# Patient Record
Sex: MALE | Race: WHITE | URBAN - METROPOLITAN AREA
[De-identification: names, ages, dates, MRNs, and addresses within clinical notes are randomized per-mention and may not be internally consistent; named-entity substitution may affect disease eponyms.]

---

## 2017-08-28 ENCOUNTER — OFFICE VISIT (OUTPATIENT)
Dept: FAMILY MEDICINE | Facility: CLINIC | Age: 32
End: 2017-08-28

## 2017-08-28 VITALS
OXYGEN SATURATION: 98 % | BODY MASS INDEX: 28.42 KG/M2 | TEMPERATURE: 98 F | DIASTOLIC BLOOD PRESSURE: 88 MMHG | SYSTOLIC BLOOD PRESSURE: 128 MMHG | HEIGHT: 66 IN | WEIGHT: 176.8 LBS | HEART RATE: 70 BPM

## 2017-08-28 DIAGNOSIS — Z00.00 ROUTINE HISTORY AND PHYSICAL EXAMINATION OF ADULT: ICD-10-CM

## 2017-08-28 DIAGNOSIS — Z11.3 SCREEN FOR STD (SEXUALLY TRANSMITTED DISEASE): ICD-10-CM

## 2017-08-28 DIAGNOSIS — R22.30 SHOULDER MASS: Primary | ICD-10-CM

## 2017-08-28 DIAGNOSIS — Z71.89 ACP (ADVANCE CARE PLANNING): ICD-10-CM

## 2017-08-28 LAB — GLUCOSE SERPL-MCNC: 95 MG/DL (ref 60–99)

## 2017-08-28 PROCEDURE — 99395 PREV VISIT EST AGE 18-39: CPT | Performed by: PHYSICIAN ASSISTANT

## 2017-08-28 PROCEDURE — 87389 HIV-1 AG W/HIV-1&-2 AB AG IA: CPT | Mod: 90 | Performed by: PHYSICIAN ASSISTANT

## 2017-08-28 PROCEDURE — 80061 LIPID PANEL: CPT | Mod: 90 | Performed by: PHYSICIAN ASSISTANT

## 2017-08-28 PROCEDURE — 87491 CHLMYD TRACH DNA AMP PROBE: CPT | Mod: 90 | Performed by: PHYSICIAN ASSISTANT

## 2017-08-28 PROCEDURE — 36415 COLL VENOUS BLD VENIPUNCTURE: CPT | Performed by: PHYSICIAN ASSISTANT

## 2017-08-28 PROCEDURE — 73030 X-RAY EXAM OF SHOULDER: CPT | Mod: RT | Performed by: PHYSICIAN ASSISTANT

## 2017-08-28 PROCEDURE — 82947 ASSAY GLUCOSE BLOOD QUANT: CPT | Performed by: PHYSICIAN ASSISTANT

## 2017-08-28 PROCEDURE — 86592 SYPHILIS TEST NON-TREP QUAL: CPT | Mod: 90 | Performed by: PHYSICIAN ASSISTANT

## 2017-08-28 PROCEDURE — 87591 N.GONORRHOEAE DNA AMP PROB: CPT | Mod: 90 | Performed by: PHYSICIAN ASSISTANT

## 2017-08-28 NOTE — PROGRESS NOTES
SUBJECTIVE:     CC: Brayden Kang is an 32 year old male who presents for preventative health visit.     Healthy Habits:      Amount of exercise or daily activities, outside of work: 2-3 days week exercising    Problems taking medications regularly not applicable    Medication side effects: No    Have you had an eye exam in the past two years? yes    Do you see a dentist twice per year? Yes    Do you have sleep apnea, excessive snoring or daytime drowsiness?no      Last week noticed bump on right shoulder  Not painful  No trauma known.  About 10 years ago felt popping sensation     Today's PHQ-2 Score:   PHQ-2 ( 1999 [employer]) 8/28/2017 7/22/2016   Q1: Little interest or pleasure in doing things 0 0   Q2: Feeling down, depressed or hopeless 0 0   PHQ-2 Score 0 0         Social History   Substance Use Topics     Smoking status: Former Smoker     Packs/day: 0.50     Years: 6.00     Types: Cigarettes     Smokeless tobacco: Never Used      Comment: Quit smoking early 2011     Alcohol use 1.2 oz/week     2 Standard drinks or equivalent per week     The patient does not drink >3 drinks per day nor >7 drinks per week.    Last PSA: No results found for: PSA    Recent Labs   Lab Test  07/22/16   1142  03/25/15   0904   CHOL  195  172   HDL  57  50   LDL  125  109   TRIG  63  64   CHOLHDLRATIO  3.4  3.4       Reviewed orders with patient. Reviewed health maintenance and updated orders accordingly - Yes    All Histories reviewed and updated in Epic.  Past Medical History:   Diagnosis Date     Atypical mole 1993    mole removed from chin, benign      No past surgical history on file.    ROS:  C: NEGATIVE for fever, chills, change in weight  I: NEGATIVE for worrisome rashes, moles or lesions  E: NEGATIVE for vision changes or irritation  ENT: NEGATIVE for ear, mouth and throat problems  R: NEGATIVE for significant cough or SOB  CV: NEGATIVE for chest pain, palpitations or peripheral edema  GI: NEGATIVE for nausea, abdominal pain,  heartburn, or change in bowel habits   male: negative for dysuria, hematuria, decreased urinary stream, erectile dysfunction, urethral discharge  M: NEGATIVE for significant arthralgias or myalgia  N: NEGATIVE for weakness, dizziness or paresthesias  P: NEGATIVE for changes in mood or affect    Problem list, Medication list, Allergies, and Medical/Social/Surgical histories reviewed in EPIC and updated as appropriate.  Labs reviewed in EPIC  BP Readings from Last 3 Encounters:   17 128/88   16 122/80   03/25/15 118/72    Wt Readings from Last 3 Encounters:   17 80.2 kg (176 lb 12.8 oz)   16 74.8 kg (165 lb)   03/25/15 70.8 kg (156 lb)                  Patient Active Problem List   Diagnosis     Health Care Home     Family history of ulcerative colitis - father -  of sclerosing cholangitis     ACP (advance care planning)     No past surgical history on file.    Social History   Substance Use Topics     Smoking status: Former Smoker     Packs/day: 0.50     Years: 6.00     Types: Cigarettes     Smokeless tobacco: Never Used      Comment: Quit smoking early      Alcohol use 1.2 oz/week     2 Standard drinks or equivalent per week     Family History   Problem Relation Age of Onset     Uterine Cancer Mother      GASTROINTESTINAL DISEASE Father       sclerosing cholangitis, ulcerative colitis /ulcerative colitis      Ulcerative Colitis Father      Breast Cancer Other 24     Cousin- maternal     Skin Cancer Paternal Aunt      C.A.D. No family hx of      DIABETES No family hx of      Cancer - colorectal No family hx of      Prostate Cancer No family hx of      Hypertension No family hx of      Hyperlipidemia No family hx of      Ovarian Cancer No family hx of      Depression/Anxiety No family hx of      CEREBROVASCULAR DISEASE No family hx of      Anesthesia Reaction No family hx of      Thyroid Disease No family hx of      Asthma No family hx of      Chemical Addiction No family hx of   "        No current outpatient prescriptions on file.     No Known Allergies  Recent Labs   Lab Test  07/22/16   1142  03/25/15   0904  04/01/14   1105   LDL  125  109  95   HDL  57  50  67   TRIG  63  64  78   ALT   --   22  28   CR   --   1.06  0.97   GFRESTIMATED   --   95  107   POTASSIUM   --   4.3  4.2      OBJECTIVE:     /88 (BP Location: Left arm, Patient Position: Chair, Cuff Size: Adult Regular)  Pulse 70  Temp 98  F (36.7  C) (Oral)  Ht 1.676 m (5' 6\")  Wt 80.2 kg (176 lb 12.8 oz)  SpO2 98%  BMI 28.54 kg/m2  EXAM:  GENERAL: healthy, alert and no distress  EYES: Eyes grossly normal to inspection, PERRL and conjunctivae and sclerae normal  HENT: ear canals and TM's normal, nose and mouth without ulcers or lesions  NECK: no adenopathy, no asymmetry, masses, or scars and thyroid normal to palpation  RESP: lungs clear to auscultation - no rales, rhonchi or wheezes  CV: regular rate and rhythm, normal S1 S2, no S3 or S4, no murmur, click or rub  ABDOMEN: soft, nontender, no hepatosplenomegaly, no masses and bowel sounds normal   (male): normal male genitalia without lesions or urethral discharge, no hernia  RECTAL: normal sphincter tone, no rectal masses, prostate normal size, smooth, nontender without nodules or masses   MS: no gross musculoskeletal defects noted, no edema  Right shoulder there is a firm mass near the AC joint. Mass is below the skin and very hard.   SKIN: no suspicious lesions or rashes  NEURO: Normal strength and tone, mentation intact and speech normal  PSYCH: mentation appears normal, affect normal/bright    Xray : NL    ASSESSMENT/PLAN:     1. ACP (advance care planning)      2. Shoulder mass  Suspect this is just the end of the clavicle. Advised that if growing larger or becoming painful that he be seen by ortho  - X-ray rt shoulder 2 view    3. Routine history and physical examination of adult    - VENOUS COLLECTION  - Lipid Profile  - Glucose Fasting (BFP)    4. Screen for " "STD (sexually transmitted disease)    - RPR W/REFLEX TITER & CONFIRM  - Chlam Trach/N. Gonorrhoeae RNA TMA([facility]  - HIV 1/2 Agn Susi 4th Gen w Reflex ([facility])  - VENOUS COLLECTION    COUNSELING:   Special attention given to:        Regular exercise       Healthy diet/nutrition         Blood Pressure:   BP Readings from Last 6 Encounters:   08/28/17 128/88   07/22/16 122/80   03/25/15 118/72   04/01/14 114/70   09/04/12 104/80   01/21/08 110/60         BP Screening:   Last 3 BP Readings:    BP Readings from Last 3 Encounters:   08/28/17 128/88   07/22/16 122/80   03/25/15 118/72       The following was recommended to the patient:  Re-screen BP within a year and recommended lifestyle modifications       reports that he has quit smoking. His smoking use included Cigarettes. He has a 3.00 pack-year smoking history. He has never used smokeless tobacco.      Estimated body mass index is 28.54 kg/(m^2) as calculated from the following:    Height as of this encounter: 1.676 m (5' 6\").    Weight as of this encounter: 80.2 kg (176 lb 12.8 oz).   Weight management plan: Discussed healthy diet and exercise guidelines and patient will follow up in 12 months in clinic to re-evaluate.    Counseling Resources:  ATP IV Guidelines  Pooled Cohorts Equation Calculator  FRAX Risk Assessment  ICSI Preventive Guidelines  Dietary Guidelines for Americans, 2010  USDA's MyPlate  ASA Prophylaxis  Lung CA Screening    ZAKIYA Navarrete  [facility]    "

## 2017-08-28 NOTE — NURSING NOTE
Brayden is here for CPX and biometric form    Patient is here for a full physical exam.    Pre-Visit Screening :  Immunizations : up to date  Colon Screening : NA  Mammogram:NA  Asthma Action Test/Plan : NA  PHQ9/GAD7 :  NA  Fall Risk Assessment NA    Vitals:  Pulse - regular  My Chart - accepts    CLASSIFICATION OF OVERWEIGHT AND OBESITY BY BMI                         Obesity Class           BMI(kg/m2)  Underweight                                    < 18.5  Normal                                         18.5-24.9  Overweight                                     25.0-29.9  OBESITY                     I                  30.0-34.9                              II                 35.0-39.9  EXTREME OBESITY             III                >40                             Patient's  BMI Body mass index is 28.54 kg/(m^2).  http://hin.nhlbi.nih.gov/menuplanner/menu.cgi  Questioned patient about current smoking habits.  Pt. quit smoking some time ago.    ETOH screening:  Questions:  1-How often do you have a drink containing alcohol?                             2 times per week(s)  2-How many drinks containing alcohol do you have on a typical day when you are         Drinking?                              1   3- How often do you have 5 or more drinks on one occasion?                              Never    Have you ever:  None of the patient's responses to the CAGE screening were positive / Negative CAGE score

## 2017-08-28 NOTE — MR AVS SNAPSHOT
After Visit Summary   8/28/2017    Brayden Kang    MRN: [id number]           Patient Information     Date Of Birth          1985        Visit Information        Provider Department      8/28/2017 10:30 AM Whitney Roe PA [facility]        Today's Diagnoses     Shoulder mass    -  1    ACP (advance care planning)        Routine history and physical examination of adult        Screen for STD (sexually transmitted disease)           Follow-ups after your visit        Who to contact     If you have questions or need follow up information about today's clinic visit or your schedule please contact [facility] directly at [phone].  Normal or non-critical lab and imaging results will be communicated to you by MyChart, letter or phone within 4 business days after the clinic has received the results. If you do not hear from us within 7 days, please contact the clinic through [employer] or phone. If you have a critical or abnormal lab result, we will notify you by phone as soon as possible.  Submit refill requests through [employer] or call your pharmacy and they will forward the refill request to us. Please allow 3 business days for your refill to be completed.          Additional Information About Your Visit        MyChart Information     [employer] gives you secure access to your electronic health record. If you see a primary care provider, you can also send messages to your care team and make appointments. If you have questions, please call your primary care clinic.  If you do not have a primary care provider, please call [phone] and they will assist you.        Care EveryWhere ID     This is your Care EveryWhere ID. This could be used by other organizations to access your [facility] medical records  [id number]        Your Vitals Were     Pulse Temperature Height Pulse Oximetry BMI (Body Mass Index)       70 98  F (36.7  C) (Oral) 1.676 m  "(5' 6\") 98% 28.54 kg/m2        Blood Pressure from Last 3 Encounters:   08/28/17 128/88   07/22/16 122/80   03/25/15 118/72    Weight from Last 3 Encounters:   08/28/17 80.2 kg (176 lb 12.8 oz)   07/22/16 74.8 kg (165 lb)   03/25/15 70.8 kg (156 lb)              We Performed the Following     Chlam Trach/N. Gonorrhoeae RNA TMA([facility]     Glucose Fasting (BFP)     HIV 1/2 Agn Susi 4th Gen w Reflex ([facility])     Lipid Profile     RPR W/REFLEX TITER & CONFIRM     VENOUS COLLECTION     X-ray rt shoulder 2 view        Primary Care Provider Office Phone # Fax #    ZAKIYA Navarrete [phone] [address]  [facility] [address]        Equal Access to Services     : Hadii aad ku hadasho Soomaali, waaxda luqadaha, qaybta kaalmada adeegyada, waxay idiin hayaan adeeg kharajose d hernándezn . So [facility] [phone].    ATENCIÓN: Si habla español, tiene a bailey disposición servicios gratuitos de asistencia lingüística. Llame al [phone].    We comply with applicable federal civil rights laws and [place] laws. We do not discriminate on the basis of race, color, national origin, age, disability sex, sexual orientation or gender identity.            Thank you!     Thank you for choosing [facility]  for your care. Our goal is always to provide you with excellent care. Hearing back from our patients is one way we can continue to improve our services. Please take a few minutes to complete the written survey that you may receive in the mail after your visit with us. Thank you!             Your Updated Medication List - Protect others around you: Learn how to safely use, store and throw away your medicines at www.disposemymeds.org.      Notice  As of 8/28/2017 12:44 PM    You have not been prescribed any medications.      "

## 2017-08-29 LAB
CHLAMYDIA TRACHOMATIS RNA, TMA - QUEST: NOT DETECTED
CHOLEST SERPL-MCNC: 167 MG/DL
CHOLEST/HDLC SERPL: 2.4 (CALC)
HDLC SERPL-MCNC: 70 MG/DL
HIV 1/2 AGN ABY 4TH GEN WITH REFLEX: NORMAL
LDLC SERPL CALC-MCNC: 83 MG/DL (CALC)
NEISSERIA GONORRHOEAE RNA TMA: NOT DETECTED
NONHDLC SERPL-MCNC: 97 MG/DL (CALC)
RPR SCREEN - QUEST: NORMAL
SEE NOTE - QUEST: NORMAL
TRIGL SERPL-MCNC: 49 MG/DL

## 2017-09-11 ENCOUNTER — TELEPHONE (OUTPATIENT)
Dept: FAMILY MEDICINE | Facility: CLINIC | Age: 32
End: 2017-09-11

## 2017-09-11 NOTE — TELEPHONE ENCOUNTER
The [employer] Screening forms were left when Brayden had his physical from 8/28/17.    Form has been completed, signed and I am waiting to hear back from Pt because we do not have a number to fax this form to.       Copy has been made for our records and the original has been mailed back to Pt.     Julia.PRATIK Reyes ([facility])
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
## 2019-10-02 ENCOUNTER — HEALTH MAINTENANCE LETTER (OUTPATIENT)
Age: 34
End: 2019-10-02

## 2021-01-15 ENCOUNTER — HEALTH MAINTENANCE LETTER (OUTPATIENT)
Age: 36
End: 2021-01-15

## 2021-09-04 ENCOUNTER — HEALTH MAINTENANCE LETTER (OUTPATIENT)
Age: 36
End: 2021-09-04

## 2022-02-19 ENCOUNTER — HEALTH MAINTENANCE LETTER (OUTPATIENT)
Age: 37
End: 2022-02-19

## 2022-10-22 ENCOUNTER — HEALTH MAINTENANCE LETTER (OUTPATIENT)
Age: 37
End: 2022-10-22

## 2023-04-01 ENCOUNTER — HEALTH MAINTENANCE LETTER (OUTPATIENT)
Age: 38
End: 2023-04-01